# Patient Record
Sex: FEMALE | Race: WHITE | NOT HISPANIC OR LATINO | URBAN - METROPOLITAN AREA
[De-identification: names, ages, dates, MRNs, and addresses within clinical notes are randomized per-mention and may not be internally consistent; named-entity substitution may affect disease eponyms.]

---

## 2020-08-11 ENCOUNTER — EMERGENCY (EMERGENCY)
Facility: HOSPITAL | Age: 25
LOS: 0 days | Discharge: HOME | End: 2020-08-11
Payer: COMMERCIAL

## 2020-08-11 VITALS
HEART RATE: 80 BPM | DIASTOLIC BLOOD PRESSURE: 67 MMHG | SYSTOLIC BLOOD PRESSURE: 113 MMHG | OXYGEN SATURATION: 99 % | RESPIRATION RATE: 16 BRPM | TEMPERATURE: 99 F

## 2020-08-11 DIAGNOSIS — Z11.59 ENCOUNTER FOR SCREENING FOR OTHER VIRAL DISEASES: ICD-10-CM

## 2020-08-11 PROCEDURE — 99283 EMERGENCY DEPT VISIT LOW MDM: CPT

## 2020-08-11 PROCEDURE — U0003: CPT

## 2020-08-11 NOTE — ED STATDOCS - PHYSICAL EXAMINATION
Constitutional: NAD AAOx3. Nontoxic, well appearing. Speaking full sentences  w/o distress  Eyes: EOMI, pupils equal  Head: Normocephalic atraumatic  Mouth: no airway obstruction  Cardiac: [id number]   Resp: Lungs CTAB  GI: Abd s/nt/nd  Neuro: motor and sensory intact

## 2020-08-11 NOTE — ED ADULT NURSE NOTE - OBJECTIVE STATEMENT
Patient evaluated, treated, and discharged by PA. Refer to PA note for assessment. Discharge instructions provided

## 2020-08-11 NOTE — ED STATDOCS - OBJECTIVE STATEMENT
Pt presents to ED for covid swab. Needs swab prior to school. No complaints at this time  Pt here for testing.

## 2020-08-11 NOTE — ED STATDOCS - PATIENT PORTAL LINK FT
You can access the FollowMyHealth Patient Portal offered by [facility] by registering at the following website: [email or web address]. By joining [employer]’s FollowMyHealth portal, you will also be able to view your health information using other applications (apps) compatible with our system.

## 2020-08-12 LAB — SARS-COV-2 RNA SPEC QL NAA+PROBE: SIGNIFICANT CHANGE UP

## 2021-08-16 ENCOUNTER — EMERGENCY (EMERGENCY)
Facility: HOSPITAL | Age: 26
LOS: 0 days | Discharge: HOME | End: 2021-08-16
Attending: EMERGENCY MEDICINE
Payer: COMMERCIAL

## 2021-08-16 VITALS
OXYGEN SATURATION: 96 % | HEART RATE: 57 BPM | HEIGHT: 66 IN | TEMPERATURE: 98 F | RESPIRATION RATE: 15 BRPM | WEIGHT: 130.07 LBS | SYSTOLIC BLOOD PRESSURE: 123 MMHG | DIASTOLIC BLOOD PRESSURE: 79 MMHG

## 2021-08-16 VITALS
HEART RATE: 56 BPM | SYSTOLIC BLOOD PRESSURE: 115 MMHG | DIASTOLIC BLOOD PRESSURE: 63 MMHG | OXYGEN SATURATION: 100 % | RESPIRATION RATE: 16 BRPM

## 2021-08-16 DIAGNOSIS — Z87.19 PERSONAL HISTORY OF OTHER DISEASES OF THE DIGESTIVE SYSTEM: ICD-10-CM

## 2021-08-16 DIAGNOSIS — R07.9 CHEST PAIN, UNSPECIFIED: ICD-10-CM

## 2021-08-16 DIAGNOSIS — R11.0 NAUSEA: ICD-10-CM

## 2021-08-16 DIAGNOSIS — R00.1 BRADYCARDIA, UNSPECIFIED: ICD-10-CM

## 2021-08-16 LAB
ALBUMIN SERPL ELPH-MCNC: 4.2 G/DL — SIGNIFICANT CHANGE UP (ref 3.3–5)
ALP SERPL-CCNC: 67 U/L — SIGNIFICANT CHANGE UP (ref 40–120)
ALT FLD-CCNC: 38 U/L — SIGNIFICANT CHANGE UP (ref 12–78)
ANION GAP SERPL CALC-SCNC: 2 MMOL/L — LOW (ref 5–17)
AST SERPL-CCNC: 27 U/L — SIGNIFICANT CHANGE UP (ref 15–37)
BASOPHILS # BLD AUTO: 0.06 K/UL — SIGNIFICANT CHANGE UP (ref 0–0.2)
BASOPHILS NFR BLD AUTO: 0.5 % — SIGNIFICANT CHANGE UP (ref 0–2)
BILIRUB SERPL-MCNC: 0.3 MG/DL — SIGNIFICANT CHANGE UP (ref 0.2–1.2)
BUN SERPL-MCNC: 12 MG/DL — SIGNIFICANT CHANGE UP (ref 7–23)
CALCIUM SERPL-MCNC: 9 MG/DL — SIGNIFICANT CHANGE UP (ref 8.5–10.1)
CHLORIDE SERPL-SCNC: 108 MMOL/L — SIGNIFICANT CHANGE UP (ref 96–108)
CO2 SERPL-SCNC: 27 MMOL/L — SIGNIFICANT CHANGE UP (ref 22–31)
CREAT SERPL-MCNC: 0.75 MG/DL — SIGNIFICANT CHANGE UP (ref 0.5–1.3)
EOSINOPHIL # BLD AUTO: 0.31 K/UL — SIGNIFICANT CHANGE UP (ref 0–0.5)
EOSINOPHIL NFR BLD AUTO: 2.8 % — SIGNIFICANT CHANGE UP (ref 0–6)
GLUCOSE SERPL-MCNC: 98 MG/DL — SIGNIFICANT CHANGE UP (ref 70–99)
HCT VFR BLD CALC: 40.4 % — SIGNIFICANT CHANGE UP (ref 34.5–45)
HGB BLD-MCNC: 13.7 G/DL — SIGNIFICANT CHANGE UP (ref 11.5–15.5)
IMM GRANULOCYTES NFR BLD AUTO: 0.4 % — SIGNIFICANT CHANGE UP (ref 0–1.5)
LIDOCAIN IGE QN: 86 U/L — SIGNIFICANT CHANGE UP (ref 73–393)
LYMPHOCYTES # BLD AUTO: 1.63 K/UL — SIGNIFICANT CHANGE UP (ref 1–3.3)
LYMPHOCYTES # BLD AUTO: 14.7 % — SIGNIFICANT CHANGE UP (ref 13–44)
MCHC RBC-ENTMCNC: 29.7 PG — SIGNIFICANT CHANGE UP (ref 27–34)
MCHC RBC-ENTMCNC: 33.9 GM/DL — SIGNIFICANT CHANGE UP (ref 32–36)
MCV RBC AUTO: 87.4 FL — SIGNIFICANT CHANGE UP (ref 80–100)
MONOCYTES # BLD AUTO: 0.5 K/UL — SIGNIFICANT CHANGE UP (ref 0–0.9)
MONOCYTES NFR BLD AUTO: 4.5 % — SIGNIFICANT CHANGE UP (ref 2–14)
NEUTROPHILS # BLD AUTO: 8.58 K/UL — HIGH (ref 1.8–7.4)
NEUTROPHILS NFR BLD AUTO: 77.1 % — HIGH (ref 43–77)
PLATELET # BLD AUTO: 275 K/UL — SIGNIFICANT CHANGE UP (ref 150–400)
POTASSIUM SERPL-MCNC: 4 MMOL/L — SIGNIFICANT CHANGE UP (ref 3.5–5.3)
POTASSIUM SERPL-SCNC: 4 MMOL/L — SIGNIFICANT CHANGE UP (ref 3.5–5.3)
PROT SERPL-MCNC: 7.2 GM/DL — SIGNIFICANT CHANGE UP (ref 6–8.3)
RBC # BLD: 4.62 M/UL — SIGNIFICANT CHANGE UP (ref 3.8–5.2)
RBC # FLD: 12.3 % — SIGNIFICANT CHANGE UP (ref 10.3–14.5)
SODIUM SERPL-SCNC: 137 MMOL/L — SIGNIFICANT CHANGE UP (ref 135–145)
WBC # BLD: 11.12 K/UL — HIGH (ref 3.8–10.5)
WBC # FLD AUTO: 11.12 K/UL — HIGH (ref 3.8–10.5)

## 2021-08-16 PROCEDURE — 93010 ELECTROCARDIOGRAM REPORT: CPT

## 2021-08-16 PROCEDURE — 99284 EMERGENCY DEPT VISIT MOD MDM: CPT | Mod: 25

## 2021-08-16 PROCEDURE — 80053 COMPREHEN METABOLIC PANEL: CPT

## 2021-08-16 PROCEDURE — 71046 X-RAY EXAM CHEST 2 VIEWS: CPT | Mod: 26

## 2021-08-16 PROCEDURE — 99284 EMERGENCY DEPT VISIT MOD MDM: CPT

## 2021-08-16 PROCEDURE — 36415 COLL VENOUS BLD VENIPUNCTURE: CPT

## 2021-08-16 PROCEDURE — 96374 THER/PROPH/DIAG INJ IV PUSH: CPT

## 2021-08-16 PROCEDURE — 71046 X-RAY EXAM CHEST 2 VIEWS: CPT

## 2021-08-16 PROCEDURE — 83690 ASSAY OF LIPASE: CPT

## 2021-08-16 PROCEDURE — 85025 COMPLETE CBC W/AUTO DIFF WBC: CPT

## 2021-08-16 PROCEDURE — 93005 ELECTROCARDIOGRAM TRACING: CPT

## 2021-08-16 RX ORDER — SODIUM CHLORIDE 9 MG/ML
1000 INJECTION INTRAMUSCULAR; INTRAVENOUS; SUBCUTANEOUS ONCE
Refills: 0 | Status: COMPLETED | OUTPATIENT
Start: 2021-08-16 | End: 2021-08-16

## 2021-08-16 RX ORDER — FAMOTIDINE 10 MG/ML
20 INJECTION INTRAVENOUS ONCE
Refills: 0 | Status: COMPLETED | OUTPATIENT
Start: 2021-08-16 | End: 2021-08-16

## 2021-08-16 RX ORDER — SUCRALFATE 1 G
1 TABLET ORAL ONCE
Refills: 0 | Status: COMPLETED | OUTPATIENT
Start: 2021-08-16 | End: 2021-08-16

## 2021-08-16 RX ADMIN — SODIUM CHLORIDE 2000 MILLILITER(S): 9 INJECTION INTRAMUSCULAR; INTRAVENOUS; SUBCUTANEOUS at 12:33

## 2021-08-16 RX ADMIN — SODIUM CHLORIDE 1000 MILLILITER(S): 9 INJECTION INTRAMUSCULAR; INTRAVENOUS; SUBCUTANEOUS at 13:12

## 2021-08-16 RX ADMIN — FAMOTIDINE 20 MILLIGRAM(S): 10 INJECTION INTRAVENOUS at 12:33

## 2021-08-16 RX ADMIN — Medication 1 GRAM(S): at 12:32

## 2021-08-16 NOTE — ED ADULT NURSE NOTE - NSIMPLEMENTINTERV_GEN_ALL_ED
Implemented All Universal Safety Interventions:  [place] to call system. Call bell, personal items and telephone within reach. Instruct patient to call for assistance. Room bathroom lighting operational. Non-slip footwear when patient is off stretcher. Physically safe environment: no spills, clutter or unnecessary equipment. Stretcher in lowest position, wheels locked, appropriate side rails in place.

## 2021-08-16 NOTE — ED STATDOCS - PROGRESS NOTE DETAILS
reid: discussed w/ pt, labs unremarkable, XR grossly nl. Will plan for dc w/ ppi, pt to f/u with gc for recurrent gastritis.

## 2021-08-16 NOTE — ED STATDOCS - OBJECTIVE STATEMENT
25 y/o F with no significant PMHx presents to the ED c/o CP that started this morning. pt was showering this morning and had CP suddenly, states that she was crying on the floor because the pain was so bad.  States she felt a lot of pressure in her chest. c/o nausea, no vomiting.  No recent travel. Had COVID December 2020. Reports she is using an IUD. 27 y/o F with a PMHx of gastritis presents to the ED c/o CP that started this morning.  States she initially thought the CP was due to acid reflex since she has a hx of gastritis. pt was showering this morning and had CP suddenly, states that she was crying on the floor because the pain was so bad.  States she felt a lot of pressure in her chest. c/o nausea, no vomiting.  No recent travel. Had COVID December 2020. Reports she is using an IUD.

## 2021-08-16 NOTE — ED STATDOCS - CLINICAL SUMMARY MEDICAL DECISION MAKING FREE TEXT BOX
pt with sudden onset of CP this morning. EKG non ischemic. hx of gastritis, symptoms likely gastritis. will give Pepcid and Carafate. Will r/o pancreatitis. will get CXR to r/o pneumothorax.

## 2021-08-16 NOTE — ED STATDOCS - NS ED ROS FT
Gen: No fever, normal appetite  Eyes: No eye irritation or discharge  ENT: No ear pain, congestion, sore throat  Resp: No cough or trouble breathing  Cardiovascular: (+) chest pain  Gastroenteric: (+) nausea. No vomiting, diarrhea, constipation  :  No change in urine output; no dysuria  MS: No joint or muscle pain  Skin: No rashes  Neuro: No headache; no abnormal movements  Remainder negative, except as per the HPI

## 2021-08-16 NOTE — ED STATDOCS - PATIENT PORTAL LINK FT
- Take valium 5mg up to 3 times per day as needed for muscle spasm. Do no drive while taking this medication  - Take tylenol 1000mg as needed for pain every 6 hours  - Take Motrin 600mg every 6 hours as needed for pain. Take with food.  - Return to ER if you experience worsening headache, vomiting, loss of consciousness, or signs of infection such as high fever or chills  - Otherwise follow up with neurology outpatient [phone] You can access the FollowMyHealth Patient Portal offered by [facility] by registering at the following website: [email or web address]. By joining [employer]’s FollowMyHealth portal, you will also be able to view your health information using other applications (apps) compatible with our system.

## 2021-08-16 NOTE — ED ADULT NURSE NOTE - OBJECTIVE STATEMENT
Patient has epigastric pain and burning with increasing nausea.  Patient took TUMS and Maalox with no relief.

## 2021-08-16 NOTE — ED STATDOCS - NSFOLLOWUPINSTRUCTIONS_ED_ALL_ED_FT
Please follow up with your primary care provider for further concerns you may have regarding your general health. Attached you will find your results from today's visit. Continue taking your medications as prescribed and keep your upcoming medical appointments.    Consider trialing omeprazole (available over the counter) along with mylanta twice daily after meals for symptomatic improvement.     Read the attached handout on gastritis.

## 2022-05-14 ENCOUNTER — EMERGENCY (EMERGENCY)
Facility: HOSPITAL | Age: 27
LOS: 0 days | Discharge: HOME | End: 2022-05-14
Attending: EMERGENCY MEDICINE
Payer: COMMERCIAL

## 2022-05-14 VITALS
OXYGEN SATURATION: 66 % | TEMPERATURE: 98 F | SYSTOLIC BLOOD PRESSURE: 114 MMHG | RESPIRATION RATE: 16 BRPM | HEART RATE: 63 BPM | DIASTOLIC BLOOD PRESSURE: 70 MMHG

## 2022-05-14 VITALS — HEIGHT: 66 IN | WEIGHT: 134.92 LBS

## 2022-05-14 DIAGNOSIS — M54.50 LOW BACK PAIN, UNSPECIFIED: ICD-10-CM

## 2022-05-14 DIAGNOSIS — K29.70 GASTRITIS, UNSPECIFIED, WITHOUT BLEEDING: ICD-10-CM

## 2022-05-14 DIAGNOSIS — R10.30 LOWER ABDOMINAL PAIN, UNSPECIFIED: ICD-10-CM

## 2022-05-14 DIAGNOSIS — R11.0 NAUSEA: ICD-10-CM

## 2022-05-14 DIAGNOSIS — R10.31 RIGHT LOWER QUADRANT PAIN: ICD-10-CM

## 2022-05-14 DIAGNOSIS — N39.0 URINARY TRACT INFECTION, SITE NOT SPECIFIED: ICD-10-CM

## 2022-05-14 LAB
ALBUMIN SERPL ELPH-MCNC: 3.9 G/DL — SIGNIFICANT CHANGE UP (ref 3.3–5)
ALP SERPL-CCNC: 61 U/L — SIGNIFICANT CHANGE UP (ref 40–120)
ALT FLD-CCNC: 24 U/L — SIGNIFICANT CHANGE UP (ref 12–78)
ANION GAP SERPL CALC-SCNC: 5 MMOL/L — SIGNIFICANT CHANGE UP (ref 5–17)
APPEARANCE UR: ABNORMAL
AST SERPL-CCNC: 17 U/L — SIGNIFICANT CHANGE UP (ref 15–37)
BASOPHILS # BLD AUTO: 0.04 K/UL — SIGNIFICANT CHANGE UP (ref 0–0.2)
BASOPHILS NFR BLD AUTO: 0.4 % — SIGNIFICANT CHANGE UP (ref 0–2)
BILIRUB SERPL-MCNC: 0.3 MG/DL — SIGNIFICANT CHANGE UP (ref 0.2–1.2)
BILIRUB UR-MCNC: NEGATIVE — SIGNIFICANT CHANGE UP
BUN SERPL-MCNC: 15 MG/DL — SIGNIFICANT CHANGE UP (ref 7–23)
CALCIUM SERPL-MCNC: 9.2 MG/DL — SIGNIFICANT CHANGE UP (ref 8.5–10.1)
CHLORIDE SERPL-SCNC: 106 MMOL/L — SIGNIFICANT CHANGE UP (ref 96–108)
CO2 SERPL-SCNC: 27 MMOL/L — SIGNIFICANT CHANGE UP (ref 22–31)
COLOR SPEC: YELLOW — SIGNIFICANT CHANGE UP
CREAT SERPL-MCNC: 0.76 MG/DL — SIGNIFICANT CHANGE UP (ref 0.5–1.3)
DIFF PNL FLD: ABNORMAL
EGFR: 111 ML/MIN/1.73M2 — SIGNIFICANT CHANGE UP
EOSINOPHIL # BLD AUTO: 0.13 K/UL — SIGNIFICANT CHANGE UP (ref 0–0.5)
EOSINOPHIL NFR BLD AUTO: 1.2 % — SIGNIFICANT CHANGE UP (ref 0–6)
GLUCOSE SERPL-MCNC: 91 MG/DL — SIGNIFICANT CHANGE UP (ref 70–99)
GLUCOSE UR QL: NEGATIVE — SIGNIFICANT CHANGE UP
HCG SERPL-ACNC: <1 MIU/ML — SIGNIFICANT CHANGE UP
HCT VFR BLD CALC: 37 % — SIGNIFICANT CHANGE UP (ref 34.5–45)
HGB BLD-MCNC: 12.2 G/DL — SIGNIFICANT CHANGE UP (ref 11.5–15.5)
IMM GRANULOCYTES NFR BLD AUTO: 0.3 % — SIGNIFICANT CHANGE UP (ref 0–1.5)
KETONES UR-MCNC: ABNORMAL
LEUKOCYTE ESTERASE UR-ACNC: ABNORMAL
LIDOCAIN IGE QN: 80 U/L — SIGNIFICANT CHANGE UP (ref 73–393)
LYMPHOCYTES # BLD AUTO: 1.41 K/UL — SIGNIFICANT CHANGE UP (ref 1–3.3)
LYMPHOCYTES # BLD AUTO: 12.5 % — LOW (ref 13–44)
MCHC RBC-ENTMCNC: 28.9 PG — SIGNIFICANT CHANGE UP (ref 27–34)
MCHC RBC-ENTMCNC: 33 GM/DL — SIGNIFICANT CHANGE UP (ref 32–36)
MCV RBC AUTO: 87.7 FL — SIGNIFICANT CHANGE UP (ref 80–100)
MONOCYTES # BLD AUTO: 0.68 K/UL — SIGNIFICANT CHANGE UP (ref 0–0.9)
MONOCYTES NFR BLD AUTO: 6 % — SIGNIFICANT CHANGE UP (ref 2–14)
NEUTROPHILS # BLD AUTO: 8.98 K/UL — HIGH (ref 1.8–7.4)
NEUTROPHILS NFR BLD AUTO: 79.6 % — HIGH (ref 43–77)
NITRITE UR-MCNC: NEGATIVE — SIGNIFICANT CHANGE UP
PH UR: 6 — SIGNIFICANT CHANGE UP (ref 5–8)
PLATELET # BLD AUTO: 261 K/UL — SIGNIFICANT CHANGE UP (ref 150–400)
POTASSIUM SERPL-MCNC: 3.7 MMOL/L — SIGNIFICANT CHANGE UP (ref 3.5–5.3)
POTASSIUM SERPL-SCNC: 3.7 MMOL/L — SIGNIFICANT CHANGE UP (ref 3.5–5.3)
PROT SERPL-MCNC: 6.6 GM/DL — SIGNIFICANT CHANGE UP (ref 6–8.3)
PROT UR-MCNC: 15
RBC # BLD: 4.22 M/UL — SIGNIFICANT CHANGE UP (ref 3.8–5.2)
RBC # FLD: 11.8 % — SIGNIFICANT CHANGE UP (ref 10.3–14.5)
SODIUM SERPL-SCNC: 138 MMOL/L — SIGNIFICANT CHANGE UP (ref 135–145)
SP GR SPEC: 1.02 — SIGNIFICANT CHANGE UP (ref 1.01–1.02)
UROBILINOGEN FLD QL: 1
WBC # BLD: 11.27 K/UL — HIGH (ref 3.8–10.5)
WBC # FLD AUTO: 11.27 K/UL — HIGH (ref 3.8–10.5)

## 2022-05-14 PROCEDURE — 81001 URINALYSIS AUTO W/SCOPE: CPT

## 2022-05-14 PROCEDURE — 99283 EMERGENCY DEPT VISIT LOW MDM: CPT

## 2022-05-14 PROCEDURE — 99205 OFFICE O/P NEW HI 60 MIN: CPT

## 2022-05-14 PROCEDURE — 83690 ASSAY OF LIPASE: CPT

## 2022-05-14 PROCEDURE — 74176 CT ABD & PELVIS W/O CONTRAST: CPT | Mod: 26,MA

## 2022-05-14 PROCEDURE — 85025 COMPLETE CBC W/AUTO DIFF WBC: CPT

## 2022-05-14 PROCEDURE — 80053 COMPREHEN METABOLIC PANEL: CPT

## 2022-05-14 PROCEDURE — 96374 THER/PROPH/DIAG INJ IV PUSH: CPT

## 2022-05-14 PROCEDURE — 87086 URINE CULTURE/COLONY COUNT: CPT

## 2022-05-14 PROCEDURE — 93975 VASCULAR STUDY: CPT

## 2022-05-14 PROCEDURE — 99285 EMERGENCY DEPT VISIT HI MDM: CPT

## 2022-05-14 PROCEDURE — 99284 EMERGENCY DEPT VISIT MOD MDM: CPT | Mod: 25

## 2022-05-14 PROCEDURE — 74176 CT ABD & PELVIS W/O CONTRAST: CPT | Mod: MA

## 2022-05-14 PROCEDURE — 84702 CHORIONIC GONADOTROPIN TEST: CPT

## 2022-05-14 PROCEDURE — 76830 TRANSVAGINAL US NON-OB: CPT

## 2022-05-14 PROCEDURE — 76830 TRANSVAGINAL US NON-OB: CPT | Mod: 26

## 2022-05-14 PROCEDURE — 36415 COLL VENOUS BLD VENIPUNCTURE: CPT

## 2022-05-14 PROCEDURE — 96375 TX/PRO/DX INJ NEW DRUG ADDON: CPT

## 2022-05-14 PROCEDURE — 93975 VASCULAR STUDY: CPT | Mod: 26

## 2022-05-14 RX ORDER — AZTREONAM 2 G
1 VIAL (EA) INJECTION
Qty: 14 | Refills: 0
Start: 2022-05-14 | End: 2022-05-20

## 2022-05-14 RX ORDER — ONDANSETRON 8 MG/1
4 TABLET, FILM COATED ORAL ONCE
Refills: 0 | Status: COMPLETED | OUTPATIENT
Start: 2022-05-14 | End: 2022-05-14

## 2022-05-14 RX ORDER — KETOROLAC TROMETHAMINE 30 MG/ML
15 SYRINGE (ML) INJECTION ONCE
Refills: 0 | Status: DISCONTINUED | OUTPATIENT
Start: 2022-05-14 | End: 2022-05-14

## 2022-05-14 RX ORDER — SODIUM CHLORIDE 9 MG/ML
1000 INJECTION INTRAMUSCULAR; INTRAVENOUS; SUBCUTANEOUS ONCE
Refills: 0 | Status: COMPLETED | OUTPATIENT
Start: 2022-05-14 | End: 2022-05-14

## 2022-05-14 RX ADMIN — SODIUM CHLORIDE 1000 MILLILITER(S): 9 INJECTION INTRAMUSCULAR; INTRAVENOUS; SUBCUTANEOUS at 21:10

## 2022-05-14 RX ADMIN — ONDANSETRON 4 MILLIGRAM(S): 8 TABLET, FILM COATED ORAL at 21:10

## 2022-05-14 RX ADMIN — Medication 1 TABLET(S): at 23:45

## 2022-05-14 RX ADMIN — Medication 15 MILLIGRAM(S): at 21:10

## 2022-05-14 NOTE — ED ADULT NURSE NOTE - NSIMPLEMENTINTERV_GEN_ALL_ED
Implemented All Universal Safety Interventions:  [place] to call system. Call bell, personal items and telephone within reach. Instruct patient to call for assistance. Room bathroom lighting operational. Non-slip footwear when patient is off stretcher. Physically safe environment: no spills, clutter or unnecessary equipment. Stretcher in lowest position, wheels locked, appropriate side rails in place.

## 2022-05-14 NOTE — ED STATDOCS - PATIENT PORTAL LINK FT
You can access the FollowMyHealth Patient Portal offered by [facility] by registering at the following website: [email or web address]. By joining [employer]’s FollowMyHealth portal, you will also be able to view your health information using other applications (apps) compatible with our system.

## 2022-05-14 NOTE — ED STATDOCS - OBJECTIVE STATEMENT
27 F hx gastritis here complaining episode of severe sudden onset of constant lower abdominal pain that started today afternoon, with pain now self-resolved.  states that the pain was so severe that she was doubled over laying on the floor which prompting ED visit today. pt also reports associated symptom of back pain, nausea, chills. denies vomiting, dysuria. pt denies hx of ovarian cyst. pt's sister had appendicitis so pt came in to the ED today with concern of appendicitis.

## 2022-05-14 NOTE — ED STATDOCS - PROGRESS NOTE DETAILS
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 pt aware of all her labs and imaging results and will fu with her pmd. pt well appearing  and agrees with plan.  -Eleanor Do PA-C spoke to Dr. Wilson to ask to for a read on the appendix but she states she can not comment due to no fat to see the appendix clearly, pt still has RLQ pain will consult surgery for consult now. -Eleanor Do PA-C spoke to Dr. Wilson to ask for a read on the appendix but she states she can not comment due to no fat to see the appendix clearly, pt still has RLQ pain will consult surgery for consult now. -Eleanor Do PA-C spoke to Dr. Pineda and will see pt in the ED.  -Eleanor Do PA-C spoke to surgical team  and reviewed imaging don't see any signs of inflammation and think its possible she passed a kidney stone, will treat for uti and they will follow up with Dr. Pineda. -Eleanor Do PA-C

## 2022-05-14 NOTE — ED STATDOCS - CARE PROVIDER_API CALL
Truman Pineda  SURGERY  [address] [place] [address]  Phone: [phone]  Fax: [phone]  Follow Up Time: Urgent

## 2022-05-14 NOTE — ED ADULT NURSE NOTE - OBJECTIVE STATEMENT
Pt comes to the ED complaining abd pain in lower abdomen that began approx 1 hour ago. Pt denies any N/V/D. Pt had a BM this am.  LMP 1-2 weeks ago.

## 2022-05-14 NOTE — CONSULT NOTE ADULT - ASSESSMENT
Ass:  abdominal pain  Plan:  As discussed with Dr. Pineda, no clinical evidence of acute appendicitis.  possible that the patient may have passed a small kidney stone.  Given the results of the UA, would recommend Bactrim PO to treat possible UTI.  Pt instructed to return to the ED if acute pain returns or any nausea, vomiting, fever, etc.  If pt is still not feeling 100% on Monday, pt should call Dr. Pineda's office to be evaluated.  The pt and her parents understood the plan and were in agreement.   The ER will provide the pt with the first dose of Bactrim and Dr. Pineda's contact information..

## 2022-05-14 NOTE — ED STATDOCS - NS ED ATTENDING STATEMENT MOD
This was a shared visit with the ESPINOZA. I reviewed and verified the documentation and independently performed the documented:

## 2022-05-14 NOTE — CONSULT NOTE ADULT - SUBJECTIVE AND OBJECTIVE BOX
Patient is a 27y old  Female who presents with a chief complaint of RLQ abdominal pain which began acutely around 4pm today    HPI:  The pt is a 28y/o F with PMH of gastritis presents to ED complaining of an episode of severe sudden onset of constant lower abdominal pain that started today late afternoon, with pain now self-resolved.  Pt states that the pain was so severe that she was doubled over laying on the floor which prompting ED visit today.  Pt also reports associated symptom of back pain, nausea, chills. Denies vomiting, dysuria. Pt denies hx of ovarian cyst. Pt's sister had appendicitis so pt came in to the ED today with concern of appendicitis.      PAST MEDICAL & SURGICAL HISTORY:  Gastritis      MEDICATIONS  (STANDING):      PHYSICAL EXAM:  T(C): 36.7 (05-14-22 @ 23:18), Max: 36.9 (05-14-22 @ 18:47)  HR: 63 (05-14-22 @ 23:18) (63 - 64)  BP: 114/70 (05-14-22 @ 23:18) (98/46 - 114/70)  RR: 16 (05-14-22 @ 23:18) (16 - 18)  SpO2: 66% (05-14-22 @ 23:18) (66% - 100%)  Wt(kg): --    Gen:  Pt is sitting up in chair, in NAD, appears comfortable    Skin:  warm and dry    HEENT:  NC/AT, PERRL, nares patent, buccal mucosa pink and moist    Neck:  trachea midline, no JVD    Respiratory: Lungs clear and equal bilat, no r/r/w    Cardiovascular:  S1S2 reg, no M    Gastrointestinal:  non-distended, +BS, soft, +RLQ tenderness, guarding, no rebound, no mass appreciated    Extremities:  FROM X 4, no c/c/e    Neurological:  A & O X3, CNII-XII grossly intact, motor and sensory intact and equal bilat                            12.2   11.27 )-----------( 261      ( 14 May 2022 20:14 )             37.0       05-14    138  |  106  |  15  ----------------------------<  91  3.7   |  27  |  0.76    Ca    9.2      14 May 2022 20:14    TPro  6.6  /  Alb  3.9  /  TBili  0.3  /  DBili  x   /  AST  17  /  ALT  24  /  AlkPhos  61  05-14    Urinalysis (05.14.22 @ 21:59)   pH Urine: 6.0   Glucose Qualitative, Urine: Negative   Blood, Urine: Moderate   Color: Yellow   Urine Appearance: very cloudy   Bilirubin: Negative   Ketone - Urine: Trace   Specific Gravity: 1.020   Protein, Urine: 15   Urobilinogen: 1   Nitrite: Negative   Leukocyte Esterase Concentration: Moderate Urine Microscopic-Add On (NC) (05.14.22 @ 21:59)   Comment - Urine: Many amorphous sediments   Epithelial Cells: Many   Red Blood Cell - Urine: 0-2 /HPF   White Blood Cell - Urine: 3-5   Bacteria: Few     < from: US Transvaginal (05.14.22 @ 20:54) >  ACC: [id number] EXAM:  US TRANSVAGINAL                        ACC: [id number] EXAM:  US DPLX PELVIC                          PROCEDURE DATE:  05/14/2022    IMPRESSION:    Normal pelvic ultrasound.  No ovarian enlargement.    < end of copied text >  < from: CT Abdomen and Pelvis No Cont (05.14.22 @ 21:06) >    ACC: [id number] EXAM:  CT ABDOMEN AND PELVIS                          PROCEDURE DATE:  05/14/2022      KIDNEYS/URETERS: There is no perinephric stranding,renal edema,   hydronephrosis, obstructing stone, or intrarenal stone.    BLADDER: Incompletely distended.  REPRODUCTIVE ORGANS: IUD in place.    BOWEL: Evaluation of bowel is limited by lack of intra-abdominal fat, IV   or oral contrast. No bowel obstruction. Appendix is not visualized. No   convincing focal bowel wall thickening or diverticulitis.  PERITONEUM: No ascites.  VESSELS: Within normal limits.  RETROPERITONEUM/LYMPH NODES: No lymphadenopathy.  ABDOMINAL WALL: Within normal limits.  BONES: Within normal limits.    IMPRESSION:    No hydronephrosis or obstructing stone.

## 2022-05-14 NOTE — ED ADULT TRIAGE NOTE - CHIEF COMPLAINT QUOTE
Pt comes to the ED complaining stomach pain in lower abdomen that began approx 1 hour ago. Pt denies any N/V/D. Pt had a BM this am.  LMP 1-2 weeks ago (she thinks). Pt states that she did smoke marijuana prior to coming to the ED thinking that it might help the pain, but she has had no relief.

## 2022-05-14 NOTE — ED STATDOCS - ATTENDING APP SHARED VISIT CONTRIBUTION OF CARE
I, Ludwin Bustos, performed the initial face to face bedside interview with this patient regarding history of present illness, review of symptoms and relevant past medical, social and family history.  I completed an independent physical examination.  I was the initial provider who evaluated this patient. I have signed out the follow up of any pending tests (i.e. labs, radiological studies) to the ESPINOZA.  I have communicated the patient’s plan of care and disposition with the ESPINOZA.  The history, relevant review of systems, past medical and surgical history, medical decision making, and physical examination was documented by the scribe in my presence and I attest to the accuracy of the documentation.

## 2022-05-14 NOTE — ED STATDOCS - CLINICAL SUMMARY MEDICAL DECISION MAKING FREE TEXT BOX
pt with lower abdominal pain improved since arriving. will check labs, ultrasound, CT UA to rule out dangerous cause

## 2022-05-15 PROBLEM — K29.70 GASTRITIS, UNSPECIFIED, WITHOUT BLEEDING: Chronic | Status: ACTIVE | Noted: 2021-08-20

## 2022-05-16 LAB
CULTURE RESULTS: SIGNIFICANT CHANGE UP
SPECIMEN SOURCE: SIGNIFICANT CHANGE UP

## 2022-09-04 ENCOUNTER — EMERGENCY (EMERGENCY)
Facility: HOSPITAL | Age: 27
LOS: 0 days | Discharge: HOME | End: 2022-09-04
Attending: FAMILY MEDICINE
Payer: COMMERCIAL

## 2022-09-04 VITALS
HEART RATE: 76 BPM | RESPIRATION RATE: 14 BRPM | HEIGHT: 66 IN | DIASTOLIC BLOOD PRESSURE: 69 MMHG | OXYGEN SATURATION: 100 % | TEMPERATURE: 99 F | SYSTOLIC BLOOD PRESSURE: 96 MMHG | WEIGHT: 160.06 LBS

## 2022-09-04 VITALS
RESPIRATION RATE: 16 BRPM | OXYGEN SATURATION: 99 % | TEMPERATURE: 98 F | SYSTOLIC BLOOD PRESSURE: 111 MMHG | DIASTOLIC BLOOD PRESSURE: 60 MMHG | HEART RATE: 70 BPM

## 2022-09-04 DIAGNOSIS — F10.10 ALCOHOL ABUSE, UNCOMPLICATED: ICD-10-CM

## 2022-09-04 DIAGNOSIS — R11.2 NAUSEA WITH VOMITING, UNSPECIFIED: ICD-10-CM

## 2022-09-04 DIAGNOSIS — F12.90 CANNABIS USE, UNSPECIFIED, UNCOMPLICATED: ICD-10-CM

## 2022-09-04 DIAGNOSIS — R20.2 PARESTHESIA OF SKIN: ICD-10-CM

## 2022-09-04 DIAGNOSIS — E86.0 DEHYDRATION: ICD-10-CM

## 2022-09-04 DIAGNOSIS — R11.10 VOMITING, UNSPECIFIED: ICD-10-CM

## 2022-09-04 LAB
ADD ON TEST-SPECIMEN IN LAB: SIGNIFICANT CHANGE UP
ALBUMIN SERPL ELPH-MCNC: 4.7 G/DL — SIGNIFICANT CHANGE UP (ref 3.3–5)
ALP SERPL-CCNC: 77 U/L — SIGNIFICANT CHANGE UP (ref 40–120)
ALT FLD-CCNC: 30 U/L — SIGNIFICANT CHANGE UP (ref 12–78)
ANION GAP SERPL CALC-SCNC: 10 MMOL/L — SIGNIFICANT CHANGE UP (ref 5–17)
AST SERPL-CCNC: 29 U/L — SIGNIFICANT CHANGE UP (ref 15–37)
BASOPHILS # BLD AUTO: 0.04 K/UL — SIGNIFICANT CHANGE UP (ref 0–0.2)
BASOPHILS NFR BLD AUTO: 0.4 % — SIGNIFICANT CHANGE UP (ref 0–2)
BILIRUB SERPL-MCNC: 0.3 MG/DL — SIGNIFICANT CHANGE UP (ref 0.2–1.2)
BUN SERPL-MCNC: 14 MG/DL — SIGNIFICANT CHANGE UP (ref 7–23)
CALCIUM SERPL-MCNC: 9.7 MG/DL — SIGNIFICANT CHANGE UP (ref 8.5–10.1)
CHLORIDE SERPL-SCNC: 111 MMOL/L — HIGH (ref 96–108)
CO2 SERPL-SCNC: 22 MMOL/L — SIGNIFICANT CHANGE UP (ref 22–31)
CREAT SERPL-MCNC: 0.84 MG/DL — SIGNIFICANT CHANGE UP (ref 0.5–1.3)
EGFR: 99 ML/MIN/1.73M2 — SIGNIFICANT CHANGE UP
EOSINOPHIL # BLD AUTO: 0.01 K/UL — SIGNIFICANT CHANGE UP (ref 0–0.5)
EOSINOPHIL NFR BLD AUTO: 0.1 % — SIGNIFICANT CHANGE UP (ref 0–6)
GLUCOSE SERPL-MCNC: 73 MG/DL — SIGNIFICANT CHANGE UP (ref 70–99)
HCT VFR BLD CALC: 41.7 % — SIGNIFICANT CHANGE UP (ref 34.5–45)
HGB BLD-MCNC: 13.6 G/DL — SIGNIFICANT CHANGE UP (ref 11.5–15.5)
IMM GRANULOCYTES NFR BLD AUTO: 0.5 % — SIGNIFICANT CHANGE UP (ref 0–1.5)
LIDOCAIN IGE QN: 53 U/L — LOW (ref 73–393)
LYMPHOCYTES # BLD AUTO: 0.99 K/UL — LOW (ref 1–3.3)
LYMPHOCYTES # BLD AUTO: 9.1 % — LOW (ref 13–44)
MCHC RBC-ENTMCNC: 28.6 PG — SIGNIFICANT CHANGE UP (ref 27–34)
MCHC RBC-ENTMCNC: 32.6 GM/DL — SIGNIFICANT CHANGE UP (ref 32–36)
MCV RBC AUTO: 87.6 FL — SIGNIFICANT CHANGE UP (ref 80–100)
MONOCYTES # BLD AUTO: 0.28 K/UL — SIGNIFICANT CHANGE UP (ref 0–0.9)
MONOCYTES NFR BLD AUTO: 2.6 % — SIGNIFICANT CHANGE UP (ref 2–14)
NEUTROPHILS # BLD AUTO: 9.55 K/UL — HIGH (ref 1.8–7.4)
NEUTROPHILS NFR BLD AUTO: 87.3 % — HIGH (ref 43–77)
PLATELET # BLD AUTO: 252 K/UL — SIGNIFICANT CHANGE UP (ref 150–400)
POTASSIUM SERPL-MCNC: 4.4 MMOL/L — SIGNIFICANT CHANGE UP (ref 3.5–5.3)
POTASSIUM SERPL-SCNC: 4.4 MMOL/L — SIGNIFICANT CHANGE UP (ref 3.5–5.3)
PROT SERPL-MCNC: 7.7 GM/DL — SIGNIFICANT CHANGE UP (ref 6–8.3)
RBC # BLD: 4.76 M/UL — SIGNIFICANT CHANGE UP (ref 3.8–5.2)
RBC # FLD: 12 % — SIGNIFICANT CHANGE UP (ref 10.3–14.5)
SODIUM SERPL-SCNC: 143 MMOL/L — SIGNIFICANT CHANGE UP (ref 135–145)
WBC # BLD: 10.92 K/UL — HIGH (ref 3.8–10.5)
WBC # FLD AUTO: 10.92 K/UL — HIGH (ref 3.8–10.5)

## 2022-09-04 PROCEDURE — 80053 COMPREHEN METABOLIC PANEL: CPT

## 2022-09-04 PROCEDURE — 83690 ASSAY OF LIPASE: CPT

## 2022-09-04 PROCEDURE — 99284 EMERGENCY DEPT VISIT MOD MDM: CPT | Mod: 25

## 2022-09-04 PROCEDURE — 36415 COLL VENOUS BLD VENIPUNCTURE: CPT

## 2022-09-04 PROCEDURE — 84702 CHORIONIC GONADOTROPIN TEST: CPT

## 2022-09-04 PROCEDURE — 99284 EMERGENCY DEPT VISIT MOD MDM: CPT

## 2022-09-04 PROCEDURE — 85025 COMPLETE CBC W/AUTO DIFF WBC: CPT

## 2022-09-04 PROCEDURE — 96374 THER/PROPH/DIAG INJ IV PUSH: CPT

## 2022-09-04 RX ORDER — ACETAMINOPHEN 500 MG
650 TABLET ORAL ONCE
Refills: 0 | Status: COMPLETED | OUTPATIENT
Start: 2022-09-04 | End: 2022-09-04

## 2022-09-04 RX ORDER — ONDANSETRON 8 MG/1
1 TABLET, FILM COATED ORAL
Qty: 6 | Refills: 0
Start: 2022-09-04 | End: 2022-09-05

## 2022-09-04 RX ORDER — ONDANSETRON 8 MG/1
4 TABLET, FILM COATED ORAL ONCE
Refills: 0 | Status: COMPLETED | OUTPATIENT
Start: 2022-09-04 | End: 2022-09-04

## 2022-09-04 RX ORDER — SODIUM CHLORIDE 9 MG/ML
1000 INJECTION INTRAMUSCULAR; INTRAVENOUS; SUBCUTANEOUS ONCE
Refills: 0 | Status: COMPLETED | OUTPATIENT
Start: 2022-09-04 | End: 2022-09-04

## 2022-09-04 RX ADMIN — Medication 650 MILLIGRAM(S): at 15:42

## 2022-09-04 RX ADMIN — ONDANSETRON 4 MILLIGRAM(S): 8 TABLET, FILM COATED ORAL at 15:42

## 2022-09-04 RX ADMIN — SODIUM CHLORIDE 1000 MILLILITER(S): 9 INJECTION INTRAMUSCULAR; INTRAVENOUS; SUBCUTANEOUS at 15:42

## 2022-09-04 NOTE — ED PROVIDER NOTE - OBJECTIVE STATEMENT
26 yo F with no PMHx presents to ED reporting she has been vomiting all morning and unable to tolerate PO.  She drank many liquor drinks last night, unsure exact number but less than 10 and smoked some marijuana when she got home.  States she normally tolerates this fine, she has never vomited the next day from drinking.  denies abdominal pain.  LMP 1 week ago, has IUD.

## 2022-09-04 NOTE — ED PROVIDER NOTE - PATIENT PORTAL LINK FT
You can access the FollowMyHealth Patient Portal offered by [facility] by registering at the following website: [email or web address]. By joining [employer]’s FollowMyHealth portal, you will also be able to view your health information using other applications (apps) compatible with our system.

## 2022-09-04 NOTE — ED PROVIDER NOTE - NSFOLLOWUPINSTRUCTIONS_ED_ALL_ED_FT
Take zofran 4mg under the tongue every four hours as needed for nausea. Maintain a light diet today. Avoid caffeine, fatty foods, and acidic food like tomato sauce and orange juice. Follow up with your doctor. Take zofran 4mg under the tongue three times daily as needed for nausea. Maintain a light diet today. Avoid caffeine, fatty foods, and acidic food like tomato sauce and orange juice. Follow up with your doctor.

## 2022-09-04 NOTE — ED PROVIDER NOTE - ATTENDING APP SHARED VISIT CONTRIBUTION OF CARE
Pt eval, treatment and plan contemporaneously with RADHA Tucker. Agree with notes. Pedro Luis BENJAMIN

## 2022-09-04 NOTE — ED PROVIDER NOTE - PROGRESS NOTE DETAILS
Armida Saba: 26 yo F with PMHx of gastritis presents to ED c/o n/v that started this morning. Pt states she vomited 6 times today and had numb fingers the first few times she vomited.  Father at bedside states pt did report feeling hot previously, but not fever hot. Denies vomiting blood, diarrhea, fever, recent travel, HA, or unusual food intake. Pt states she is feeling better now, but dehydrated. PCP - Dr. Ontiveros    Pt RN note, pt was drinking and doing marijuana last night. Pt feels better able to tolerate po. Awaiting u/a result. Pedro Luis BENJAMIN

## 2022-09-04 NOTE — ED PROVIDER NOTE - CLINICAL SUMMARY MEDICAL DECISION MAKING FREE TEXT BOX
26 yo F with PMHx of gastritis presents to ED post drinking and marijuana, pt vomiting 6 times today. labs, IV fluid, Zofran, probably discharge.

## 2022-09-04 NOTE — ED ADULT TRIAGE NOTE - CHIEF COMPLAINT QUOTE
Patient comes in s/p drinking and doing marijuana last night. Patient with c/o vomiting and dehydration.

## 2024-12-06 ENCOUNTER — EMERGENCY (EMERGENCY)
Facility: HOSPITAL | Age: 29
LOS: 0 days | Discharge: HOME | End: 2024-12-07
Attending: EMERGENCY MEDICINE
Payer: COMMERCIAL

## 2024-12-06 VITALS — WEIGHT: 182.98 LBS

## 2024-12-06 DIAGNOSIS — M25.512 PAIN IN LEFT SHOULDER: ICD-10-CM

## 2024-12-06 DIAGNOSIS — Z82.49 FAMILY HISTORY OF ISCHEMIC HEART DISEASE AND OTHER DISEASES OF THE CIRCULATORY SYSTEM: ICD-10-CM

## 2024-12-06 DIAGNOSIS — R07.89 OTHER CHEST PAIN: ICD-10-CM

## 2024-12-06 PROCEDURE — 36415 COLL VENOUS BLD VENIPUNCTURE: CPT

## 2024-12-06 PROCEDURE — 80053 COMPREHEN METABOLIC PANEL: CPT

## 2024-12-06 PROCEDURE — 36000 PLACE NEEDLE IN VEIN: CPT

## 2024-12-06 PROCEDURE — 84484 ASSAY OF TROPONIN QUANT: CPT

## 2024-12-06 PROCEDURE — 85025 COMPLETE CBC W/AUTO DIFF WBC: CPT

## 2024-12-06 PROCEDURE — 99285 EMERGENCY DEPT VISIT HI MDM: CPT

## 2024-12-06 PROCEDURE — 85730 THROMBOPLASTIN TIME PARTIAL: CPT

## 2024-12-06 PROCEDURE — 85610 PROTHROMBIN TIME: CPT

## 2024-12-06 PROCEDURE — 93005 ELECTROCARDIOGRAM TRACING: CPT

## 2024-12-06 PROCEDURE — 99285 EMERGENCY DEPT VISIT HI MDM: CPT | Mod: 25

## 2024-12-06 PROCEDURE — 71045 X-RAY EXAM CHEST 1 VIEW: CPT

## 2024-12-06 PROCEDURE — 84702 CHORIONIC GONADOTROPIN TEST: CPT

## 2024-12-06 NOTE — ED ADULT TRIAGE NOTE - CHIEF COMPLAINT QUOTE
complains of left side chest pain worsening since earlier today, reports pain through back also now radiating down left arm. denies cardiac history. takes lexapro daily.

## 2024-12-07 VITALS
SYSTOLIC BLOOD PRESSURE: 121 MMHG | DIASTOLIC BLOOD PRESSURE: 69 MMHG | OXYGEN SATURATION: 100 % | HEART RATE: 72 BPM | TEMPERATURE: 98 F | RESPIRATION RATE: 18 BRPM

## 2024-12-07 LAB
ALBUMIN SERPL ELPH-MCNC: 3.5 G/DL — SIGNIFICANT CHANGE UP (ref 3.3–5)
ALP SERPL-CCNC: 66 U/L — SIGNIFICANT CHANGE UP (ref 40–120)
ALT FLD-CCNC: 13 U/L — SIGNIFICANT CHANGE UP (ref 12–78)
ANION GAP SERPL CALC-SCNC: 4 MMOL/L — LOW (ref 5–17)
APTT BLD: 24.8 SEC — SIGNIFICANT CHANGE UP (ref 24.5–35.6)
AST SERPL-CCNC: 14 U/L — LOW (ref 15–37)
BASOPHILS # BLD AUTO: 0.06 K/UL — SIGNIFICANT CHANGE UP (ref 0–0.2)
BASOPHILS NFR BLD AUTO: 0.9 % — SIGNIFICANT CHANGE UP (ref 0–2)
BILIRUB SERPL-MCNC: 0.2 MG/DL — SIGNIFICANT CHANGE UP (ref 0.2–1.2)
BUN SERPL-MCNC: 8 MG/DL — SIGNIFICANT CHANGE UP (ref 7–23)
CALCIUM SERPL-MCNC: 8.8 MG/DL — SIGNIFICANT CHANGE UP (ref 8.5–10.1)
CHLORIDE SERPL-SCNC: 108 MMOL/L — SIGNIFICANT CHANGE UP (ref 96–108)
CO2 SERPL-SCNC: 26 MMOL/L — SIGNIFICANT CHANGE UP (ref 22–31)
CREAT SERPL-MCNC: 0.76 MG/DL — SIGNIFICANT CHANGE UP (ref 0.5–1.3)
EGFR: 110 ML/MIN/1.73M2 — SIGNIFICANT CHANGE UP
EOSINOPHIL # BLD AUTO: 0.33 K/UL — SIGNIFICANT CHANGE UP (ref 0–0.5)
EOSINOPHIL NFR BLD AUTO: 5 % — SIGNIFICANT CHANGE UP (ref 0–6)
GLUCOSE SERPL-MCNC: 97 MG/DL — SIGNIFICANT CHANGE UP (ref 70–99)
HCG SERPL-ACNC: <1 MIU/ML — SIGNIFICANT CHANGE UP
HCT VFR BLD CALC: 37.1 % — SIGNIFICANT CHANGE UP (ref 34.5–45)
HGB BLD-MCNC: 12.2 G/DL — SIGNIFICANT CHANGE UP (ref 11.5–15.5)
IMM GRANULOCYTES NFR BLD AUTO: 0.3 % — SIGNIFICANT CHANGE UP (ref 0–0.9)
INR BLD: 0.86 RATIO — SIGNIFICANT CHANGE UP (ref 0.85–1.16)
LYMPHOCYTES # BLD AUTO: 2.59 K/UL — SIGNIFICANT CHANGE UP (ref 1–3.3)
LYMPHOCYTES # BLD AUTO: 39.1 % — SIGNIFICANT CHANGE UP (ref 13–44)
MCHC RBC-ENTMCNC: 28.4 PG — SIGNIFICANT CHANGE UP (ref 27–34)
MCHC RBC-ENTMCNC: 32.9 G/DL — SIGNIFICANT CHANGE UP (ref 32–36)
MCV RBC AUTO: 86.5 FL — SIGNIFICANT CHANGE UP (ref 80–100)
MONOCYTES # BLD AUTO: 0.43 K/UL — SIGNIFICANT CHANGE UP (ref 0–0.9)
MONOCYTES NFR BLD AUTO: 6.5 % — SIGNIFICANT CHANGE UP (ref 2–14)
NEUTROPHILS # BLD AUTO: 3.19 K/UL — SIGNIFICANT CHANGE UP (ref 1.8–7.4)
NEUTROPHILS NFR BLD AUTO: 48.2 % — SIGNIFICANT CHANGE UP (ref 43–77)
PLATELET # BLD AUTO: 271 K/UL — SIGNIFICANT CHANGE UP (ref 150–400)
POTASSIUM SERPL-MCNC: 3.5 MMOL/L — SIGNIFICANT CHANGE UP (ref 3.5–5.3)
POTASSIUM SERPL-SCNC: 3.5 MMOL/L — SIGNIFICANT CHANGE UP (ref 3.5–5.3)
PROT SERPL-MCNC: 6.2 GM/DL — SIGNIFICANT CHANGE UP (ref 6–8.3)
PROTHROM AB SERPL-ACNC: 10.2 SEC — SIGNIFICANT CHANGE UP (ref 9.9–13.4)
RBC # BLD: 4.29 M/UL — SIGNIFICANT CHANGE UP (ref 3.8–5.2)
RBC # FLD: 11.9 % — SIGNIFICANT CHANGE UP (ref 10.3–14.5)
SODIUM SERPL-SCNC: 138 MMOL/L — SIGNIFICANT CHANGE UP (ref 135–145)
TROPONIN I, HIGH SENSITIVITY RESULT: <3 NG/L — SIGNIFICANT CHANGE UP
WBC # BLD: 6.62 K/UL — SIGNIFICANT CHANGE UP (ref 3.8–10.5)
WBC # FLD AUTO: 6.62 K/UL — SIGNIFICANT CHANGE UP (ref 3.8–10.5)

## 2024-12-07 PROCEDURE — 93010 ELECTROCARDIOGRAM REPORT: CPT

## 2024-12-07 PROCEDURE — 71045 X-RAY EXAM CHEST 1 VIEW: CPT | Mod: 26

## 2024-12-07 NOTE — ED PROVIDER NOTE - CARE PROVIDER_API CALL
Shun Infante)  Cardiology  [address], [address]  [place] [address]  Phone: [phone]  Fax: [phone]  Follow Up Time: 4-6 Days

## 2024-12-07 NOTE — ED ADULT NURSE NOTE - CHIEF COMPLAINT
March 21, 2017                   [facility] - [facility]  Pediatric Cardiology  [address]  [facility] [address]  Phone: [phone]  Fax: [phone]   March 21, 2017     Patient: Gail Cali   YOB: 2000   Date of Visit: 3/21/2017       To Whom it May Concern:    Gail Cali was seen in my clinic on 3/21/2017. She may return to school on 3/22/2017.    If you have any questions or concerns, please don't hesitate to call.    Sincerely,         Melody Sandoval MA     
The patient is a 29y Female complaining of chest pain.

## 2024-12-07 NOTE — ED PROVIDER NOTE - PATIENT PORTAL LINK FT
You can access the FollowMyHealth Patient Portal offered by [facility] by registering at the following website: [email or web address]. By joining [email or web address]’s FollowMyHealth portal, you will also be able to view your health information using other applications (apps) compatible with our system.

## 2024-12-07 NOTE — ED PROVIDER NOTE - CLINICAL SUMMARY MEDICAL DECISION MAKING FREE TEXT BOX
29-year-old female with no pertinent past medical history presents for evaluation of intermittent left  sternal border chest pain radiating to the left upper back that began a few hours after waking up yesterday morning but had resolved in the afternoon and then reoccurred about 3 hours prior to arrival that is worse with movement of the left arm and taking deep breaths.  Patient denies any cough or fever.  Patient denies any recent travel or trauma.  Patient denies any leg pain or swelling.  She states that she has family history of cardiac disease.  she admits to vaping, smoking marijuana and occasionally drinking alcohol but not within the last 24 to 48 hours.  Patient notes that her pain did improve somewhat after taking ibuprofen at home about 2 hours prior to arrival.  On exam, patient is awake, alert and oriented x 3 in no apparent distress.  Heart is regular rate and rhythm no murmurs rubs or gallops.  Patient does have some mild tenderness to palpation of the left trapezius.  Lungs are clear to auscultation bilaterally.      Patient's presentation is most consistent with musculoskeletal chest pain but given family history we will get troponin–I x 1, CBC, CMP, chest x-ray and hCG.

## 2024-12-07 NOTE — ED ADULT NURSE NOTE - OBJECTIVE STATEMENT
pt. is a 30 y/o female a&o x4 with no pertinent past medical history presents for evaluation of intermittent left  sternal border chest pain radiating to the left upper back that began a few hours after waking up yesterday morning but had resolved in the afternoon and then reoccurred about 3 hours prior to arrival that is worse with movement of the left arm and taking deep breaths.  she admits to vaping, smoking marijuana and occasionally drinking alcohol but not within the last 24 to 48 hours. 20 g IV placed in left Ac with labs sent

## 2024-12-07 NOTE — ED PROVIDER NOTE - PHYSICAL EXAMINATION
CONSTITUTIONAL: Well appearing, awake, alert, oriented to person, place, time/situation and in no apparent distress.  · ENMT: Airway patent, Nasal mucosa clear. Mouth with normal mucosa. Throat has no vesicles, no oropharyngeal exudates and uvula is midline.  · EYES: Clear bilaterally, pupils equal, round and reactive to light.  · CARDIAC: Normal rate, regular rhythm.  Heart sounds S1, S2.  No murmurs, rubs or gallops.  · RESPIRATORY: Breath sounds clear and equal bilaterally.  · GASTROINTESTINAL: Abdomen soft, non-tender, no guarding.  · MUSCULOSKELETAL: Spine appears normal, range of motion is not limited,   Left trapezius tenderness to palpation  · NEUROLOGICAL: Alert and oriented, no focal deficits, no motor or sensory deficits.  · SKIN: Skin normal color for race, warm, dry and intact. No evidence of rash

## 2024-12-07 NOTE — ED PROVIDER NOTE - NSFOLLOWUPINSTRUCTIONS_ED_ALL_ED_FT
Follow-up with your primary care provider within the next 3 days.    Chest Pain    Chest pain can be caused by many different conditions which may or may not be dangerous. Causes include heartburn, lung infections, heart attack, blood clot in lungs, skin infections, strain or damage to muscle, cartilage, or bones, etc. In addition to a history and physical examination, an electrocardiogram (ECG) or other lab tests may have been performed to determine the cause of your chest pain. Follow up with your primary care provider or with a cardiologist as instructed.     SEEK IMMEDIATE MEDICAL CARE IF YOU HAVE ANY OF THE FOLLOWING SYMPTOMS: worsening chest pain, coughing up blood, unexplained back/neck/jaw pain, severe abdominal pain, dizziness or lightheadedness, fainting, shortness of breath, sweaty or clammy skin, vomiting, or racing heart beat. These symptoms may represent a serious problem that is an emergency. Do not wait to see if the symptoms will go away. Get medical help right away. Call 911 and do not drive yourself to the hospital.